# Patient Record
Sex: MALE | Race: WHITE | NOT HISPANIC OR LATINO | Employment: FULL TIME | URBAN - METROPOLITAN AREA
[De-identification: names, ages, dates, MRNs, and addresses within clinical notes are randomized per-mention and may not be internally consistent; named-entity substitution may affect disease eponyms.]

---

## 2019-07-12 LAB
ALBUMIN: 4 G/DL (ref 3.4–5)
ALP BLD-CCNC: 86 U/L (ref 33–120)
ALT SERPL-CCNC: 39 U/L (ref 10–52)
ANION GAP SERPL CALCULATED.3IONS-SCNC: 11 MMOL/L (ref 10–20)
AST SERPL-CCNC: 27 U/L (ref 9–39)
BICARBONATE: 27 MMOL/L (ref 21–32)
BILIRUB SERPL-MCNC: 0.5 MG/DL (ref 0–1.2)
CALCIUM SERPL-MCNC: 8.9 MG/DL (ref 8.6–10.3)
CHLORIDE BLD-SCNC: 104 MMOL/L (ref 98–107)
CHOLESTEROL/HDL RATIO: 4.5
CHOLESTEROL: 147 MG/DL (ref 0–199)
CREAT SERPL-MCNC: 0.87 MG/DL (ref 0.5–1.3)
ERYTHROCYTE [DISTWIDTH] IN BLOOD BY AUTOMATED COUNT: 11.9 % (ref 11.5–14)
GFR AFRICAN AMERICAN: >60 ML/MIN/1.73M2
GFR NON-AFRICAN AMERICAN: >60 ML/MIN/1.73M2
GLUCOSE: 104 MG/DL (ref 74–99)
HCT VFR BLD CALC: 45.3 % (ref 41–52)
HDLC SERPL-MCNC: 33 MG/DL
HEMOGLOBIN: 15.1 G/DL (ref 13.5–17)
LDL CHOLESTEROL: 94 MG/DL (ref 0–99)
MCHC RBC AUTO-ENTMCNC: 33.3 G/DL (ref 32–36)
MCV RBC AUTO: 99 FL (ref 80–100)
PLATELET # BLD: 245 X10E9/L (ref 150–450)
POTASSIUM SERPL-SCNC: 4.2 MMOL/L (ref 3.5–5.3)
RBC # BLD: 4.58 X10E12/L (ref 4.5–5.9)
SODIUM BLD-SCNC: 138 MMOL/L (ref 136–145)
TOTAL PROTEIN: 6.4 G/DL (ref 6.4–8.2)
TRIGL SERPL-MCNC: 100 MG/DL (ref 0–149)
UREA NITROGEN: 20 MG/DL (ref 6–23)
VLDLC SERPL CALC-MCNC: 20 MG/DL (ref 0–40)
WBC: 6.6 X10E9/L (ref 4.4–11.3)

## 2021-07-14 LAB
ALBUMIN SERPL-MCNC: 4 G/DL (ref 3.5–4.6)
ALP BLD-CCNC: 93 U/L (ref 35–104)
ALT SERPL-CCNC: 34 U/L (ref 0–41)
ANION GAP SERPL CALCULATED.3IONS-SCNC: 9 MEQ/L (ref 9–15)
AST SERPL-CCNC: 26 U/L (ref 0–40)
BILIRUB SERPL-MCNC: 0.5 MG/DL (ref 0.2–0.7)
BUN BLDV-MCNC: 16 MG/DL (ref 6–20)
CALCIUM SERPL-MCNC: 8.9 MG/DL (ref 8.5–9.9)
CHLORIDE BLD-SCNC: 103 MEQ/L (ref 95–107)
CO2: 24 MEQ/L (ref 20–31)
CREAT SERPL-MCNC: 0.81 MG/DL (ref 0.7–1.2)
GFR AFRICAN AMERICAN: >60
GFR NON-AFRICAN AMERICAN: >60
GLOBULIN: 2.5 G/DL (ref 2.3–3.5)
GLUCOSE BLD-MCNC: 101 MG/DL (ref 70–99)
HCT VFR BLD CALC: 44.9 % (ref 42–52)
HEMOGLOBIN: 15.1 G/DL (ref 14–18)
MCH RBC QN AUTO: 33.6 PG (ref 27–31.3)
MCHC RBC AUTO-ENTMCNC: 33.7 % (ref 33–37)
MCV RBC AUTO: 99.8 FL (ref 80–100)
PDW BLD-RTO: 13.1 % (ref 11.5–14.5)
PLATELET # BLD: 228 K/UL (ref 130–400)
POTASSIUM SERPL-SCNC: 4.1 MEQ/L (ref 3.4–4.9)
RBC # BLD: 4.5 M/UL (ref 4.7–6.1)
SODIUM BLD-SCNC: 136 MEQ/L (ref 135–144)
TOTAL PROTEIN: 6.5 G/DL (ref 6.3–8)
WBC # BLD: 5.9 K/UL (ref 4.8–10.8)

## 2023-03-09 LAB
ALANINE AMINOTRANSFERASE (SGPT) (U/L) IN SER/PLAS: 31 U/L (ref 10–52)
ALBUMIN (G/DL) IN SER/PLAS: 4.1 G/DL (ref 3.4–5)
ALBUMIN: 4.1 G/DL (ref 3.4–5)
ALKALINE PHOSPHATASE (U/L) IN SER/PLAS: 77 U/L (ref 33–120)
ALP BLD-CCNC: 77 U/L (ref 33–120)
ALT SERPL-CCNC: 31 U/L (ref 10–52)
ANION GAP IN SER/PLAS: 10 MMOL/L (ref 10–20)
ANION GAP SERPL CALCULATED.3IONS-SCNC: 10 MMOL/L (ref 10–20)
ASPARTATE AMINOTRANSFERASE (SGOT) (U/L) IN SER/PLAS: 23 U/L (ref 9–39)
AST SERPL-CCNC: 23 U/L (ref 9–39)
BICARBONATE: 28 MMOL/L (ref 21–32)
BILIRUB SERPL-MCNC: 0.7 MG/DL (ref 0–1.2)
BILIRUBIN DIRECT (MG/DL) IN SER/PLAS: 0.1 MG/DL (ref 0–0.3)
BILIRUBIN DIRECT: 0.1 MG/DL (ref 0–0.3)
BILIRUBIN TOTAL (MG/DL) IN SER/PLAS: 0.7 MG/DL (ref 0–1.2)
CALCIUM (MG/DL) IN SER/PLAS: 9.2 MG/DL (ref 8.6–10.3)
CALCIUM SERPL-MCNC: 9.2 MG/DL (ref 8.6–10.3)
CARBON DIOXIDE, TOTAL (MMOL/L) IN SER/PLAS: 28 MMOL/L (ref 21–32)
CHLORIDE (MMOL/L) IN SER/PLAS: 104 MMOL/L (ref 98–107)
CHLORIDE BLD-SCNC: 104 MMOL/L (ref 98–107)
CHOLESTEROL (MG/DL) IN SER/PLAS: 101 MG/DL (ref 0–199)
CHOLESTEROL IN HDL (MG/DL) IN SER/PLAS: 36.1 MG/DL
CHOLESTEROL/HDL RATIO: 2.8
CHOLESTEROL/HDL RATIO: 2.8
CHOLESTEROL: 101 MG/DL (ref 0–199)
CREAT SERPL-MCNC: 0.93 MG/DL (ref 0.5–1.3)
CREATININE (MG/DL) IN SER/PLAS: 0.93 MG/DL (ref 0.5–1.3)
EGFR MALE: >90 ML/MIN/1.73M2
ERYTHROCYTE DISTRIBUTION WIDTH (RATIO) BY AUTOMATED COUNT: 11.9 % (ref 11.5–14.5)
ERYTHROCYTE MEAN CORPUSCULAR HEMOGLOBIN CONCENTRATION (G/DL) BY AUTOMATED: 33.7 G/DL (ref 32–36)
ERYTHROCYTE MEAN CORPUSCULAR VOLUME (FL) BY AUTOMATED COUNT: 99 FL (ref 80–100)
ERYTHROCYTE [DISTWIDTH] IN BLOOD BY AUTOMATED COUNT: 11.9 % (ref 11.5–14)
ERYTHROCYTES (10*6/UL) IN BLOOD BY AUTOMATED COUNT: 4.63 X10E12/L (ref 4.5–5.9)
GFR MALE: >90 ML/MIN/1.73M2
GLUCOSE (MG/DL) IN SER/PLAS: 99 MG/DL (ref 74–99)
GLUCOSE: 99 MG/DL (ref 74–99)
HCT VFR BLD CALC: 45.7 % (ref 41–52)
HDLC SERPL-MCNC: 36.1 MG/DL
HEMATOCRIT (%) IN BLOOD BY AUTOMATED COUNT: 45.7 % (ref 41–52)
HEMOGLOBIN (G/DL) IN BLOOD: 15.4 G/DL (ref 13.5–17.5)
HEMOGLOBIN: 15.4 G/DL (ref 13.5–17)
LDL CHOLESTEROL: 41 MG/DL (ref 0–99)
LDL: 41 MG/DL (ref 0–99)
LEUKOCYTES (10*3/UL) IN BLOOD BY AUTOMATED COUNT: 6.1 X10E9/L (ref 4.4–11.3)
MAGNESIUM (MG/DL) IN SER/PLAS: 1.74 MG/DL (ref 1.6–2.4)
MAGNESIUM: 1.74 MG/DL (ref 1.6–2.4)
MCHC RBC AUTO-ENTMCNC: 33.7 G/DL (ref 32–36)
MCV RBC AUTO: 99 FL (ref 80–100)
PLATELET # BLD: 246 X10E9/L (ref 150–450)
PLATELETS (10*3/UL) IN BLOOD AUTOMATED COUNT: 246 X10E9/L (ref 150–450)
POTASSIUM (MMOL/L) IN SER/PLAS: 4.3 MMOL/L (ref 3.5–5.3)
POTASSIUM SERPL-SCNC: 4.3 MMOL/L (ref 3.5–5.3)
PROTEIN TOTAL: 6.5 G/DL (ref 6.4–8.2)
RBC # BLD: 4.63 X10E12/L (ref 4.5–5.9)
SODIUM (MMOL/L) IN SER/PLAS: 138 MMOL/L (ref 136–145)
SODIUM BLD-SCNC: 138 MMOL/L (ref 136–145)
THYROTROPIN (MIU/L) IN SER/PLAS BY DETECTION LIMIT <= 0.05 MIU/L: 1.3 MIU/L (ref 0.44–3.98)
TOTAL PROTEIN: 6.5 G/DL (ref 6.4–8.2)
TRIGL SERPL-MCNC: 121 MG/DL (ref 0–149)
TRIGLYCERIDE (MG/DL) IN SER/PLAS: 121 MG/DL (ref 0–149)
TSH SERPL DL<=0.05 MIU/L-ACNC: 1.3 MIU/L (ref 0.44–3.9)
UREA NITROGEN (MG/DL) IN SER/PLAS: 18 MG/DL (ref 6–23)
UREA NITROGEN: 18 MG/DL (ref 6–23)
VLDL: 24 MG/DL (ref 0–40)
VLDLC SERPL CALC-MCNC: 24 MG/DL (ref 0–40)
WBC: 6.1 X10E9/L (ref 4.4–11.3)

## 2023-03-22 LAB
ANION GAP IN SER/PLAS: 10 MMOL/L (ref 10–20)
ANION GAP SERPL CALCULATED.3IONS-SCNC: 10 MMOL/L (ref 10–20)
BICARBONATE: 29 MMOL/L (ref 21–32)
CALCIUM (MG/DL) IN SER/PLAS: 8.7 MG/DL (ref 8.6–10.3)
CALCIUM SERPL-MCNC: 8.7 MG/DL (ref 8.6–10.3)
CARBON DIOXIDE, TOTAL (MMOL/L) IN SER/PLAS: 29 MMOL/L (ref 21–32)
CHLORIDE (MMOL/L) IN SER/PLAS: 104 MMOL/L (ref 98–107)
CHLORIDE BLD-SCNC: 104 MMOL/L (ref 98–107)
CREAT SERPL-MCNC: 0.89 MG/DL (ref 0.5–1.3)
CREATININE (MG/DL) IN SER/PLAS: 0.89 MG/DL (ref 0.5–1.3)
EGFR MALE: >90 ML/MIN/1.73M2
GFR MALE: >90 ML/MIN/1.73M2
GLUCOSE (MG/DL) IN SER/PLAS: 106 MG/DL (ref 74–99)
GLUCOSE: 106 MG/DL (ref 74–99)
POTASSIUM (MMOL/L) IN SER/PLAS: 4.1 MMOL/L (ref 3.5–5.3)
POTASSIUM SERPL-SCNC: 4.1 MMOL/L (ref 3.5–5.3)
SODIUM (MMOL/L) IN SER/PLAS: 139 MMOL/L (ref 136–145)
SODIUM BLD-SCNC: 139 MMOL/L (ref 136–145)
UREA NITROGEN (MG/DL) IN SER/PLAS: 20 MG/DL (ref 6–23)
UREA NITROGEN: 20 MG/DL (ref 6–23)

## 2023-07-05 LAB
ANION GAP IN SER/PLAS: 12 MMOL/L (ref 10–20)
ANION GAP SERPL CALCULATED.3IONS-SCNC: 12 MMOL/L (ref 10–20)
BICARBONATE: 28 MMOL/L (ref 21–32)
CALCIUM (MG/DL) IN SER/PLAS: 8.8 MG/DL (ref 8.6–10.3)
CALCIUM SERPL-MCNC: 8.8 MG/DL (ref 8.6–10.3)
CARBON DIOXIDE, TOTAL (MMOL/L) IN SER/PLAS: 28 MMOL/L (ref 21–32)
CHLORIDE (MMOL/L) IN SER/PLAS: 104 MMOL/L (ref 98–107)
CHLORIDE BLD-SCNC: 104 MMOL/L (ref 98–107)
CREAT SERPL-MCNC: 0.93 MG/DL (ref 0.5–1.3)
CREATININE (MG/DL) IN SER/PLAS: 0.93 MG/DL (ref 0.5–1.3)
EGFR MALE: >90 ML/MIN/1.73M2
GFR MALE: >90 ML/MIN/1.73M2
GLUCOSE (MG/DL) IN SER/PLAS: 102 MG/DL (ref 74–99)
GLUCOSE: 102 MG/DL (ref 74–99)
POTASSIUM (MMOL/L) IN SER/PLAS: 4.1 MMOL/L (ref 3.5–5.3)
POTASSIUM SERPL-SCNC: 4.1 MMOL/L (ref 3.5–5.3)
SODIUM (MMOL/L) IN SER/PLAS: 140 MMOL/L (ref 136–145)
SODIUM BLD-SCNC: 140 MMOL/L (ref 136–145)
UREA NITROGEN (MG/DL) IN SER/PLAS: 20 MG/DL (ref 6–23)
UREA NITROGEN: 20 MG/DL (ref 6–23)

## 2023-09-13 LAB
ANION GAP IN SER/PLAS: 12 MMOL/L (ref 10–20)
ANION GAP SERPL CALCULATED.3IONS-SCNC: 12 MMOL/L (ref 10–20)
BICARBONATE: 26 MMOL/L (ref 21–32)
CALCIUM (MG/DL) IN SER/PLAS: 9 MG/DL (ref 8.6–10.3)
CALCIUM SERPL-MCNC: 9 MG/DL (ref 8.6–10.3)
CARBON DIOXIDE, TOTAL (MMOL/L) IN SER/PLAS: 26 MMOL/L (ref 21–32)
CHLORIDE (MMOL/L) IN SER/PLAS: 106 MMOL/L (ref 98–107)
CHLORIDE BLD-SCNC: 106 MMOL/L (ref 98–107)
CHOLESTEROL (MG/DL) IN SER/PLAS: 84 MG/DL (ref 0–199)
CHOLESTEROL IN HDL (MG/DL) IN SER/PLAS: 39 MG/DL
CHOLESTEROL/HDL RATIO: 2.2
CHOLESTEROL/HDL RATIO: 2.2
CHOLESTEROL: 84 MG/DL (ref 0–199)
CREAT SERPL-MCNC: 0.93 MG/DL (ref 0.5–1.3)
CREATININE (MG/DL) IN SER/PLAS: 0.93 MG/DL (ref 0.5–1.3)
EGFR MALE: >90 ML/MIN/1.73M2
ERYTHROCYTE DISTRIBUTION WIDTH (RATIO) BY AUTOMATED COUNT: 12 % (ref 11.5–14.5)
ERYTHROCYTE MEAN CORPUSCULAR HEMOGLOBIN CONCENTRATION (G/DL) BY AUTOMATED: 33.6 G/DL (ref 32–36)
ERYTHROCYTE MEAN CORPUSCULAR VOLUME (FL) BY AUTOMATED COUNT: 99 FL (ref 80–100)
ERYTHROCYTE [DISTWIDTH] IN BLOOD BY AUTOMATED COUNT: 12 % (ref 11.5–14.5)
ERYTHROCYTES (10*6/UL) IN BLOOD BY AUTOMATED COUNT: 4.44 X10E12/L (ref 4.5–5.9)
GFR MALE: >90 ML/MIN/1.73M2
GLUCOSE (MG/DL) IN SER/PLAS: 116 MG/DL (ref 74–99)
GLUCOSE: 116 MG/DL (ref 74–99)
HCT VFR BLD CALC: 44 % (ref 41–52)
HDLC SERPL-MCNC: 39 MG/DL
HEMATOCRIT (%) IN BLOOD BY AUTOMATED COUNT: 44 % (ref 41–52)
HEMOGLOBIN (G/DL) IN BLOOD: 14.8 G/DL (ref 13.5–17.5)
HEMOGLOBIN: 14.8 G/DL (ref 13.5–17.5)
LDL CHOLESTEROL: 25 MG/DL (ref 0–99)
LDL: 25 MG/DL (ref 0–99)
LEUKOCYTES (10*3/UL) IN BLOOD BY AUTOMATED COUNT: 5.9 X10E9/L (ref 4.4–11.3)
MCHC RBC AUTO-ENTMCNC: 33.6 G/DL (ref 32–36)
MCV RBC AUTO: 99 FL (ref 80–100)
PLATELET # BLD: 279 X10E9/L (ref 150–450)
PLATELETS (10*3/UL) IN BLOOD AUTOMATED COUNT: 279 X10E9/L (ref 150–450)
POTASSIUM (MMOL/L) IN SER/PLAS: 4.2 MMOL/L (ref 3.5–5.3)
POTASSIUM SERPL-SCNC: 4.2 MMOL/L (ref 3.5–5.3)
RBC # BLD: 4.44 X10E12/L (ref 4.5–5.9)
SODIUM (MMOL/L) IN SER/PLAS: 140 MMOL/L (ref 136–145)
SODIUM BLD-SCNC: 140 MMOL/L (ref 136–145)
TRIGL SERPL-MCNC: 98 MG/DL (ref 0–149)
TRIGLYCERIDE (MG/DL) IN SER/PLAS: 98 MG/DL (ref 0–149)
UREA NITROGEN (MG/DL) IN SER/PLAS: 18 MG/DL (ref 6–23)
UREA NITROGEN: 18 MG/DL (ref 6–23)
VLDL: 20 MG/DL (ref 0–40)
VLDLC SERPL CALC-MCNC: 20 MG/DL (ref 0–40)
WBC: 5.9 X10E9/L (ref 4.4–11.3)

## 2023-12-26 PROBLEM — E78.5 HLD (HYPERLIPIDEMIA): Status: ACTIVE | Noted: 2023-12-26

## 2023-12-26 PROBLEM — I25.110 CORONARY ARTERY DISEASE INVOLVING NATIVE HEART WITH UNSTABLE ANGINA PECTORIS (MULTI): Status: ACTIVE | Noted: 2023-12-26

## 2023-12-26 PROBLEM — Z95.1 S/P CABG (CORONARY ARTERY BYPASS GRAFT): Status: ACTIVE | Noted: 2023-12-26

## 2023-12-26 PROBLEM — R51.9 FREQUENT HEADACHES: Status: ACTIVE | Noted: 2023-12-26

## 2023-12-26 PROBLEM — I73.9 PVD (PERIPHERAL VASCULAR DISEASE) (CMS-HCC): Status: ACTIVE | Noted: 2023-12-26

## 2023-12-26 PROBLEM — I25.10 ATHEROSCLEROSIS OF CORONARY ARTERY: Status: ACTIVE | Noted: 2023-12-26

## 2023-12-26 PROBLEM — I10 PRIMARY HYPERTENSION: Status: ACTIVE | Noted: 2023-12-26

## 2023-12-26 PROBLEM — E78.1 HYPERTRIGLYCERIDEMIA: Status: ACTIVE | Noted: 2023-12-26

## 2023-12-26 PROBLEM — R09.89 BRUIT OF LEFT CAROTID ARTERY: Status: ACTIVE | Noted: 2023-12-26

## 2023-12-26 RX ORDER — LISINOPRIL 20 MG/1
1 TABLET ORAL DAILY
COMMUNITY
End: 2024-03-28 | Stop reason: SDUPTHER

## 2023-12-26 RX ORDER — NITROGLYCERIN 0.4 MG/1
TABLET SUBLINGUAL
COMMUNITY

## 2023-12-26 RX ORDER — BUPROPION HYDROCHLORIDE 300 MG/1
1 TABLET ORAL DAILY
COMMUNITY

## 2023-12-26 RX ORDER — EVOLOCUMAB 140 MG/ML
140 INJECTION, SOLUTION SUBCUTANEOUS
COMMUNITY
Start: 2022-01-05 | End: 2024-01-11

## 2023-12-26 RX ORDER — ATORVASTATIN CALCIUM 10 MG/1
1 TABLET, FILM COATED ORAL NIGHTLY
COMMUNITY
Start: 2022-09-12 | End: 2024-03-28 | Stop reason: SDUPTHER

## 2023-12-26 RX ORDER — ASPIRIN 81 MG/1
1 TABLET ORAL DAILY
COMMUNITY

## 2024-03-28 DIAGNOSIS — E78.2 MIXED HYPERLIPIDEMIA: ICD-10-CM

## 2024-03-28 DIAGNOSIS — I10 PRIMARY HYPERTENSION: ICD-10-CM

## 2024-03-28 DIAGNOSIS — I25.110 CORONARY ARTERY DISEASE INVOLVING NATIVE CORONARY ARTERY OF NATIVE HEART WITH UNSTABLE ANGINA PECTORIS (MULTI): ICD-10-CM

## 2024-03-28 RX ORDER — LISINOPRIL 20 MG/1
20 TABLET ORAL DAILY
Qty: 90 TABLET | Refills: 0 | Status: SHIPPED | OUTPATIENT
Start: 2024-03-28

## 2024-03-28 RX ORDER — ATORVASTATIN CALCIUM 10 MG/1
10 TABLET, FILM COATED ORAL NIGHTLY
Qty: 90 TABLET | Refills: 0 | Status: SHIPPED | OUTPATIENT
Start: 2024-03-28

## 2024-03-28 NOTE — TELEPHONE ENCOUNTER
Pt called for rx refill of Atorvastatin and Lisinopril to [facility].    Escribed as requested.  Pending appt 6/19/24 with Dr. Leonard

## 2024-04-05 ENCOUNTER — TELEPHONE (OUTPATIENT)
Dept: CARDIOLOGY | Facility: CLINIC | Age: 59
End: 2024-04-05

## 2024-04-05 NOTE — TELEPHONE ENCOUNTER
Called patient's rx insurance plan in follow up to PA submitted 3/21/24.  Spoke with Shanthi who states PA approved 3/29/24.  She will fax letter to office.  Patient notified of same by phone and he verbalized understanding.  Alicia Orourke, CMA

## 2024-06-19 ENCOUNTER — APPOINTMENT (OUTPATIENT)
Dept: CARDIOLOGY | Facility: CLINIC | Age: 59
End: 2024-06-19
Payer: COMMERCIAL

## 2024-06-19 VITALS
SYSTOLIC BLOOD PRESSURE: 104 MMHG | HEART RATE: 74 BPM | HEIGHT: 68 IN | WEIGHT: 183.4 LBS | BODY MASS INDEX: 27.8 KG/M2 | DIASTOLIC BLOOD PRESSURE: 62 MMHG

## 2024-06-19 DIAGNOSIS — I25.10 ATHEROSCLEROSIS OF NATIVE CORONARY ARTERY OF NATIVE HEART WITHOUT ANGINA PECTORIS: ICD-10-CM

## 2024-06-19 DIAGNOSIS — E78.2 MIXED HYPERLIPIDEMIA: ICD-10-CM

## 2024-06-19 DIAGNOSIS — I10 PRIMARY HYPERTENSION: ICD-10-CM

## 2024-06-19 DIAGNOSIS — I77.9 BILATERAL CAROTID ARTERY DISEASE, UNSPECIFIED TYPE (CMS-HCC): ICD-10-CM

## 2024-06-19 PROBLEM — R09.89 BRUIT OF LEFT CAROTID ARTERY: Status: RESOLVED | Noted: 2023-12-26 | Resolved: 2024-06-19

## 2024-06-19 PROBLEM — E78.1 HYPERTRIGLYCERIDEMIA: Status: RESOLVED | Noted: 2023-12-26 | Resolved: 2024-06-19

## 2024-06-19 PROBLEM — I25.110 CORONARY ARTERY DISEASE INVOLVING NATIVE HEART WITH UNSTABLE ANGINA PECTORIS (MULTI): Status: RESOLVED | Noted: 2023-12-26 | Resolved: 2024-06-19

## 2024-06-19 PROCEDURE — 3074F SYST BP LT 130 MM HG: CPT | Performed by: INTERNAL MEDICINE

## 2024-06-19 PROCEDURE — 99213 OFFICE O/P EST LOW 20 MIN: CPT | Performed by: INTERNAL MEDICINE

## 2024-06-19 PROCEDURE — 3078F DIAST BP <80 MM HG: CPT | Performed by: INTERNAL MEDICINE

## 2024-06-19 RX ORDER — LISINOPRIL 20 MG/1
20 TABLET ORAL DAILY
Qty: 90 TABLET | Refills: 3 | Status: SHIPPED | OUTPATIENT
Start: 2024-06-19

## 2024-06-19 RX ORDER — ATORVASTATIN CALCIUM 10 MG/1
10 TABLET, FILM COATED ORAL NIGHTLY
Qty: 90 TABLET | Refills: 3 | Status: SHIPPED | OUTPATIENT
Start: 2024-06-19

## 2024-06-19 RX ORDER — NITROGLYCERIN 0.4 MG/1
0.4 TABLET SUBLINGUAL EVERY 5 MIN PRN
Qty: 25 TABLET | Refills: 5 | Status: SHIPPED | OUTPATIENT
Start: 2024-06-19

## 2024-06-19 NOTE — PATIENT INSTRUCTIONS
Dr. Leonard would like you to have a carotid ultrasound near future    Fasting labs to be done within next 1-2 weeks      -Please bring all medicines, vitamins, and herbal supplements with you in original bottles to every appointment!!!!    -Prescriptions will not be filled unless you are compliant with your follow up appointments or have a follow up appointment scheduled as per instruction of your physician. Refills should be requested at the time of your visit.

## 2024-06-19 NOTE — PROGRESS NOTES
Patient:  Juan Manuel Yeager  YOB: 1965  MRN: [id number]       Impression/Plan:     Diagnoses and all orders for this visit:  Atherosclerosis of native coronary artery of native heart without angina pectoris  -     Able to accomplish a very high functional capacity without angina or CHF symptoms  -    Very compliant with antiplatelet and statin therapy continue same  -     He is less than 5 years status post bypass surgery that included a left internal mammary artery bypass no indication for any sort of invasive or noninvasive assessment of coronary disease as at very low risk of progression at this point  Bilateral carotid artery disease, unspecified type (CMS-HCC)  -     No neurologic symptoms very soft bruit only been over a year since last assessment for surveillance testing obtain duplex  -     Vascular US Carotid Artery Duplex Bilateral; Future  Mixed hyperlipidemia  -     Continue statin laboratories pending  -     atorvastatin (Lipitor) 10 mg tablet; Take 1 tablet (10 mg) by mouth once daily at bedtime.  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 male who presents with coronary artery disease having undergone CABG August 2019 4 vessels including LIMA. Also with hypertension, hyperlipidemia and peripheral vascular disease.  He is statin intolerant hence on Repatha and low-dose Lipitor .  7/17/2023 CT angiography severe proximal left iliac stenosis and severe right common femoral artery stenosis with occlusion the right SFA for a length of 11 cm. No aneurysm was identified.  mild carotid artery disease duplex 2000 1950 to 69% on the left    I had last seen him 9/13/2023 at which time he had a high functional capacity without cardiovascular symptoms and he was tolerating PCSK9 inhibitor and  low-dose statin well.  At that time his symptoms were starting to improve with an exercise program.  He is followed by Dr. Linder for his severe vascular disease. lab work at that time normal with excellent control of cholesterol normal renal and liver function.    He denies angina, dyspnea, palpitation, edema, lightheadedness or syncope.  He has had no symptoms of claudication or neurologic deterioration.  There have been no hospitalizations or emergency room visits since last office visit.    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  This is a very physical job he has to carry large diameter hose 50 yards then pull it back again.  With this level of activity no chest tightness pressure heaviness or shortness of breath.  Had no palpitation, lightheadedness or syncope.  He had no difficulty sleeping.  He still has claudication but he is able to accomplish what he needs to with minimal discomfort of his legs overall it is stable and is not impeding what he wishes to do at this point in time.  He is had no discoloration of his feet.  He's has had no neurologic events.    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  He has no cardiac contraindications to drive commercially.                Review of Systems: Unremarkable except as noted above    Meds     Current Outpatient Medications   Medication Instructions    aspirin 81 mg EC tablet 1 tablet, oral, Daily    atorvastatin (LIPITOR) 10 mg, oral, Nightly    buPROPion XL (Wellbutrin XL) 300 mg 24 hr tablet 1 tablet, oral, Daily    evolocumab (Repatha SureClick) 140 mg/mL injection USE 1 INJECTION EVERY 2 WEEKS    lisinopril 20 mg, oral, Daily    nitroglycerin (Nitrostat) 0.4 mg SL tablet PLACE 1 TABLET UNDER THE TONGUE EVERY 5 MINUTES FOR UP TO 3 DOSES AS NEEDED FOR CHEST PAIN.CALL 911 IF PAIN PERSISTS.        Allergies     Allergies   Allergen Reactions    Penicillins Other    Pravastatin  "Myalgia    Rosuvastatin Myalgia    Simvastatin Other         Annotated Problems     Specialty Problems          Cardiology Problems    Atherosclerosis of coronary artery      · 9/21/2022 stress test: 10.1 MET without evidence of ischemia at 94% target heart rate      2019, 4 vessel, L-LAD, Free JOHN T graft off LIMA to OM2, SVG- D1, SVG - PLV              7/15/2019 angiography. LVEF 55%. LM-normal. LAD-90%. CX-80%. RCA-occluded         HLD (hyperlipidemia)    Primary hypertension    PVD (peripheral vascular disease) (CMS-HCC)     7/17/2023 CT angiography severe proximal left iliac stenosis and severe right common femoral artery stenosis with occlusion the right SFA for a length of 11 cm. No aneurysm was identified.         S/P CABG (coronary artery bypass graft)         2019, 4 vessel, L-LAD, Free JOHN T graft off LIMA to OM2, SVG- D1, SVG - PLV         Bilateral carotid artery disease (CMS-HCC)       2019 less than 50% right internal carotid stenosis 50 to 69% on the left via duplex             Problem List     Patient Active Problem List    Diagnosis Date Noted    Bilateral carotid artery disease (CMS-HCC) 06/19/2024    Atherosclerosis of coronary artery 12/26/2023    Frequent headaches 12/26/2023    HLD (hyperlipidemia) 12/26/2023    Primary hypertension 12/26/2023    PVD (peripheral vascular disease) (CMS-HCC) 12/26/2023    S/P CABG (coronary artery bypass graft) 12/26/2023       Objective:     Vitals:    06/19/24 0751   BP: 104/62   BP Location: Left arm   Patient Position: Sitting   Pulse: 74   Weight: 83.2 kg (183 lb 6.4 oz)   Height: 1.727 m (5' 8\")      Wt Readings from Last 4 Encounters:   06/19/24 83.2 kg (183 lb 6.4 oz)   07/27/23 86.6 kg (191 lb)   03/29/23 87.5 kg (193 lb)   03/22/23 87.5 kg (193 lb)           LAB:     Lab Results   Component Value Date    WBC 5.9 09/13/2023    HGB 14.8 09/13/2023    HCT 44.0 09/13/2023     09/13/2023    CHOL 84 09/13/2023    TRIG 98 09/13/2023    HDL 39.0 (A) " 09/13/2023    ALT 31 03/09/2023    AST 23 03/09/2023     09/13/2023    K 4.2 09/13/2023     09/13/2023    CREATININE 0.93 09/13/2023    BUN 18 09/13/2023    CO2 26 09/13/2023    TSH 1.30 03/09/2023    INR 1.1 07/15/2019    HGBA1C 5.4 07/15/2019       Diagnostic Studies:     Patient was never admitted.      Radiology:     No orders to display       Physical Exam     General Appearance: alert and oriented to person, place and time, in no acute distress  Cardiovascular: normal rate, regular rhythm, normal S1 and S2, no murmurs, rubs, clicks, or gallops,  no JVD  Pulmonary/Chest: clear to auscultation bilaterally- no wheezes, rales or rhonchi, normal air movement, no respiratory distress  Abdomen: soft, non-tender, non-distended, normal bowel sounds, no masses   Extremities: no cyanosis, clubbing or edema  Skin: warm and dry, no rash or erythema  Eyes: EOMI  Neck: supple and non-tender without mass, no thyromegaly   Neurological: alert, oriented, normal speech, no focal findings or movement disorder noted  Vascular:  2_+ pulses, soft carotid bruit              Scribe Attestation  By signing my name below, IBriseida LPN , Scribe   attest that this documentation has been prepared under the direction and in the presence of Dylan Leonard MD.

## 2024-06-26 ENCOUNTER — LAB (OUTPATIENT)
Dept: LAB | Facility: LAB | Age: 59
End: 2024-06-26
Payer: COMMERCIAL

## 2024-06-26 DIAGNOSIS — E78.2 MIXED HYPERLIPIDEMIA: ICD-10-CM

## 2024-06-26 DIAGNOSIS — I77.9 BILATERAL CAROTID ARTERY DISEASE, UNSPECIFIED TYPE (CMS-HCC): ICD-10-CM

## 2024-06-26 DIAGNOSIS — I25.10 ATHEROSCLEROSIS OF NATIVE CORONARY ARTERY OF NATIVE HEART WITHOUT ANGINA PECTORIS: ICD-10-CM

## 2024-06-26 LAB
ALBUMIN SERPL BCP-MCNC: 3.9 G/DL (ref 3.4–5)
ALP SERPL-CCNC: 95 U/L (ref 33–136)
ALT SERPL W P-5'-P-CCNC: 30 U/L (ref 10–52)
ANION GAP SERPL CALC-SCNC: 9 MMOL/L (ref 10–20)
AST SERPL W P-5'-P-CCNC: 25 U/L (ref 9–39)
BILIRUB SERPL-MCNC: 0.5 MG/DL (ref 0–1.2)
BUN SERPL-MCNC: 23 MG/DL (ref 6–23)
CALCIUM SERPL-MCNC: 8.7 MG/DL (ref 8.6–10.3)
CHLORIDE SERPL-SCNC: 106 MMOL/L (ref 98–107)
CHOLEST SERPL-MCNC: 74 MG/DL (ref 0–199)
CHOLESTEROL/HDL RATIO: 2
CO2 SERPL-SCNC: 28 MMOL/L (ref 21–32)
CREAT SERPL-MCNC: 0.89 MG/DL (ref 0.5–1.3)
EGFRCR SERPLBLD CKD-EPI 2021: >90 ML/MIN/1.73M*2
ERYTHROCYTE [DISTWIDTH] IN BLOOD BY AUTOMATED COUNT: 12.1 % (ref 11.5–14.5)
GLUCOSE SERPL-MCNC: 96 MG/DL (ref 74–99)
HCT VFR BLD AUTO: 44.2 % (ref 41–52)
HDLC SERPL-MCNC: 37.2 MG/DL
HGB BLD-MCNC: 14.9 G/DL (ref 13.5–17.5)
LDLC SERPL CALC-MCNC: 20 MG/DL
MCH RBC QN AUTO: 33.9 PG (ref 26–34)
MCHC RBC AUTO-ENTMCNC: 33.7 G/DL (ref 32–36)
MCV RBC AUTO: 101 FL (ref 80–100)
NON HDL CHOLESTEROL: 37 MG/DL (ref 0–149)
NRBC BLD-RTO: 0 /100 WBCS (ref 0–0)
PLATELET # BLD AUTO: 236 X10*3/UL (ref 150–450)
POTASSIUM SERPL-SCNC: 4.2 MMOL/L (ref 3.5–5.3)
PROT SERPL-MCNC: 6 G/DL (ref 6.4–8.2)
RBC # BLD AUTO: 4.4 X10*6/UL (ref 4.5–5.9)
SODIUM SERPL-SCNC: 139 MMOL/L (ref 136–145)
TRIGL SERPL-MCNC: 84 MG/DL (ref 0–149)
VLDL: 17 MG/DL (ref 0–40)
WBC # BLD AUTO: 7 X10*3/UL (ref 4.4–11.3)

## 2024-06-26 PROCEDURE — 80053 COMPREHEN METABOLIC PANEL: CPT

## 2024-06-26 PROCEDURE — 80061 LIPID PANEL: CPT

## 2024-06-26 PROCEDURE — 85027 COMPLETE CBC AUTOMATED: CPT

## 2024-06-26 PROCEDURE — 36415 COLL VENOUS BLD VENIPUNCTURE: CPT

## 2024-07-24 ENCOUNTER — ANCILLARY PROCEDURE (OUTPATIENT)
Dept: CARDIOLOGY | Facility: HOSPITAL | Age: 59
End: 2024-07-24
Payer: COMMERCIAL

## 2024-07-24 DIAGNOSIS — I77.9 BILATERAL CAROTID ARTERY DISEASE, UNSPECIFIED TYPE (CMS-HCC): ICD-10-CM

## 2024-07-24 DIAGNOSIS — I73.9 PERIPHERAL VASCULAR DISEASE, UNSPECIFIED (CMS-HCC): ICD-10-CM

## 2024-07-24 DIAGNOSIS — I65.23 OCCLUSION AND STENOSIS OF BILATERAL CAROTID ARTERIES: ICD-10-CM

## 2024-07-24 PROCEDURE — 93923 UPR/LXTR ART STDY 3+ LVLS: CPT | Performed by: INTERNAL MEDICINE

## 2024-07-24 PROCEDURE — 93880 EXTRACRANIAL BILAT STUDY: CPT

## 2024-07-24 PROCEDURE — 93880 EXTRACRANIAL BILAT STUDY: CPT | Performed by: INTERNAL MEDICINE

## 2024-07-24 PROCEDURE — 93923 UPR/LXTR ART STDY 3+ LVLS: CPT

## 2024-08-01 ENCOUNTER — OFFICE VISIT (OUTPATIENT)
Dept: VASCULAR SURGERY | Facility: CLINIC | Age: 59
End: 2024-08-01
Payer: COMMERCIAL

## 2024-08-01 VITALS — HEART RATE: 77 BPM | DIASTOLIC BLOOD PRESSURE: 70 MMHG | SYSTOLIC BLOOD PRESSURE: 115 MMHG | TEMPERATURE: 97.3 F

## 2024-08-01 DIAGNOSIS — I65.22 STENOSIS OF LEFT CAROTID ARTERY: ICD-10-CM

## 2024-08-01 DIAGNOSIS — I73.9 PERIPHERAL VASCULAR DISEASE, UNSPECIFIED (CMS-HCC): Primary | ICD-10-CM

## 2024-08-01 PROCEDURE — 3074F SYST BP LT 130 MM HG: CPT | Performed by: SURGERY

## 2024-08-01 PROCEDURE — 99215 OFFICE O/P EST HI 40 MIN: CPT | Performed by: SURGERY

## 2024-08-01 PROCEDURE — 3078F DIAST BP <80 MM HG: CPT | Performed by: SURGERY

## 2024-08-01 ASSESSMENT — PAIN SCALES - GENERAL: PAINLEVEL: 0-NO PAIN

## 2024-08-01 NOTE — PROGRESS NOTES
Vascular Surgery Clinic Note    CC: PAD    HPI:  Juan Manuel Yeager is 59 y.o. male with history of PAD with iliofemoral occlusive disease. We had discussed walking therapy for claudication when I saw him last year. He walks about 1 mile almost every day and says his legs have gotten much better. He works full time driving a fuel tanker and is able to complete his duties without issues. I reviewed his ANALIA which are 0.6 bilaterally, slightly better than last year. I reviewed his carotid duplex that shows <50% stenosis R ICA, 50-69% left ICA. He has not had stroke or TIA. He is maintained on ASA and repatha.     Medical History:   has no past medical history on file.    Meds:   Current Outpatient Medications on File Prior to Visit   Medication Sig Dispense Refill    aspirin 81 mg EC tablet Take 1 tablet (81 mg) by mouth once daily.      atorvastatin (Lipitor) 10 mg tablet Take 1 tablet (10 mg) by mouth once daily at bedtime. 90 tablet 3    buPROPion XL (Wellbutrin XL) 300 mg 24 hr tablet Take 1 tablet (300 mg) by mouth once daily.      evolocumab (Repatha SureClick) 140 mg/mL injection USE 1 INJECTION EVERY 2 WEEKS 6 each 3    lisinopril 20 mg tablet Take 1 tablet (20 mg) by mouth once daily. 90 tablet 3    nitroglycerin (Nitrostat) 0.4 mg SL tablet Place 1 tablet (0.4 mg) under the tongue every 5 minutes if needed for chest pain. place 1 tablet under tongue every 5 minutes for up to 3 doses as needed for chest pain. Call 911 if pain persists 25 tablet 5     No current facility-administered medications on file prior to visit.        Allergies:   Allergies   Allergen Reactions    Penicillins Other    Pravastatin Myalgia    Rosuvastatin Myalgia    Simvastatin Other       SH:    Social Determinants of Health     Tobacco Use: Medium Risk (6/19/2024)    Patient History     Smoking Tobacco Use: Former     Smokeless Tobacco Use: Never     Passive Exposure: Not on file   Alcohol Use: Not At Risk (2/7/2024)    Received from  [facility]    AUDIT-C     Frequency of Alcohol Consumption: 2-4 times a month     Average Number of Drinks: 1 or 2     Frequency of Binge Drinking: Never   Financial Resource Strain: Low Risk  (2/7/2024)    Received from [facility]    Overall Financial Resource Strain (CARDIA)     Difficulty of Paying Living Expenses: Not hard at all   Food Insecurity: No Food Insecurity (2/7/2024)    Received from [facility]    Hunger Vital Sign     Worried About Running Out of Food in the Last Year: Never true     Ran Out of Food in the Last Year: Never true   Transportation Needs: No Transportation Needs (2/7/2024)    Received from [facility]    PRAPARE - Transportation     Lack of Transportation (Medical): No     Lack of Transportation (Non-Medical): No   Physical Activity: Sufficiently Active (2/7/2024)    Received from [facility]    Exercise Vital Sign     Days of Exercise per Week: 6 days     Minutes of Exercise per Session: 40 min   Stress: No Stress Concern Present (2/7/2024)    Received from [facility] [place] [facility] - Occupational Stress Questionnaire     Feeling of Stress : Not at all   Social Connections: Moderately Isolated (2/7/2024)    Received from [facility]    Social Connection and Isolation Panel [NHANES]     Frequency of Communication with Friends and Family: More than three times a week     Frequency of Social Gatherings with Friends and Family: Once a week     Attends Restoration Services: Never     Active Member of Clubs or Organizations: No     Attends Club or Organization Meetings: Never     Marital Status:    Intimate Partner Violence: Not At Risk (2/7/2024)    Received from [facility]    Humiliation, Afraid, Rape, and Kick questionnaire     Fear of Current or Ex-Partner: No     Emotionally Abused:  No     Physically Abused: No     Sexually Abused: No   Depression: Not at risk (8/3/2019)    [facility]    PHQ-2     PHQ2 Score: 0   Housing Stability: High Risk (2/7/2024)    Received from [facility], [facility]    Housing Stability Vital Sign     Unable to Pay for Housing in the Last Year: Yes     Number of Places Lived in the Last Year: 1     Unstable Housing in the Last Year: No   Utilities: Not on file   Digital Equity: Not on file   Health Literacy: Not on file        FH:  No family history on file.     ROS:  All systems were reviewed and are negative except as per HPI.    Objective:  Vitals:  Vitals:    08/01/24 1039   BP: 115/70   Pulse: 77   Temp: 36.3 °C (97.3 °F)        Exam:  In NAD, well appearing  Abd Soft, ND/NT  Vascular examination:  Feet are warm, no wounds    Assessment & Plan:  Juan Manuel Yeager is 59 y.o. male doing well with minimally symptomatic PAD, and asympotmatic carotid stenosis. He quit smoking >20 years ago. RTC yearly for eugenia and carotid.      I spent a total of 40 minutes on the day of the visit.         Marquis Linder M.D.

## 2024-10-02 DIAGNOSIS — E78.00 PURE HYPERCHOLESTEROLEMIA: ICD-10-CM

## 2024-10-02 RX ORDER — EVOLOCUMAB 140 MG/ML
140 INJECTION, SOLUTION SUBCUTANEOUS
Qty: 6 EACH | Refills: 3 | Status: SHIPPED | OUTPATIENT
Start: 2024-10-02

## 2024-10-02 NOTE — TELEPHONE ENCOUNTER
Pt left [facility] requesting refill of Repatha. Escribed as requested to [facility].    Pending appt with Dr. Leonard 6/18/25

## 2025-01-09 ENCOUNTER — TELEPHONE (OUTPATIENT)
Dept: CARDIOLOGY | Facility: CLINIC | Age: 60
End: 2025-01-09
Payer: COMMERCIAL

## 2025-01-09 DIAGNOSIS — I25.10 ATHEROSCLEROSIS OF NATIVE CORONARY ARTERY OF NATIVE HEART WITHOUT ANGINA PECTORIS: ICD-10-CM

## 2025-01-09 NOTE — TELEPHONE ENCOUNTER
Pt returned call. He says that Repatha is covered and he will continue with this medication.     Pt transferred to [facility] to schedule pt for stress test.   Pt will keep pending appt with Dr. Leonard for 6/18/25.

## 2025-01-09 NOTE — TELEPHONE ENCOUNTER
The patient called into the office and stated that as the first of the year his insurance is no longer covering his Repatha. It will be over $500 a month and he cannot afford that at this time. 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 Please advise.

## 2025-01-09 NOTE — TELEPHONE ENCOUNTER
Advised patient of message and verbalized understanding.    Patient was given [employer] names to call insurance company.  Patient will call back with insurance response.     If not covered, patient will need lipid profile 2 months after last Repatha dose.

## 2025-05-21 ENCOUNTER — ANCILLARY PROCEDURE (OUTPATIENT)
Dept: CARDIOLOGY | Facility: HOSPITAL | Age: 60
End: 2025-05-21
Payer: COMMERCIAL

## 2025-05-21 DIAGNOSIS — I25.798 ATHEROSCLEROSIS OF OTHER CORONARY ARTERY BYPASS GRAFT(S) WITH OTHER FORMS OF ANGINA PECTORIS: ICD-10-CM

## 2025-05-21 DIAGNOSIS — I25.10 ATHEROSCLEROSIS OF NATIVE CORONARY ARTERY OF NATIVE HEART WITHOUT ANGINA PECTORIS: ICD-10-CM

## 2025-05-21 PROCEDURE — 93017 CV STRESS TEST TRACING ONLY: CPT

## 2025-05-21 PROCEDURE — 93016 CV STRESS TEST SUPVJ ONLY: CPT | Performed by: INTERNAL MEDICINE

## 2025-05-21 PROCEDURE — 93018 CV STRESS TEST I&R ONLY: CPT | Performed by: INTERNAL MEDICINE

## 2025-06-18 ENCOUNTER — APPOINTMENT (OUTPATIENT)
Dept: CARDIOLOGY | Facility: CLINIC | Age: 60
End: 2025-06-18
Payer: COMMERCIAL

## 2025-06-18 VITALS
WEIGHT: 181 LBS | HEART RATE: 83 BPM | SYSTOLIC BLOOD PRESSURE: 114 MMHG | HEIGHT: 68 IN | BODY MASS INDEX: 27.43 KG/M2 | DIASTOLIC BLOOD PRESSURE: 70 MMHG

## 2025-06-18 DIAGNOSIS — I73.9 CLAUDICATION: ICD-10-CM

## 2025-06-18 DIAGNOSIS — I10 PRIMARY HYPERTENSION: ICD-10-CM

## 2025-06-18 DIAGNOSIS — E78.2 MIXED HYPERLIPIDEMIA: ICD-10-CM

## 2025-06-18 DIAGNOSIS — I25.10 ATHEROSCLEROSIS OF NATIVE CORONARY ARTERY OF NATIVE HEART WITHOUT ANGINA PECTORIS: ICD-10-CM

## 2025-06-18 DIAGNOSIS — I73.9 PVD (PERIPHERAL VASCULAR DISEASE): ICD-10-CM

## 2025-06-18 PROCEDURE — 99214 OFFICE O/P EST MOD 30 MIN: CPT | Performed by: INTERNAL MEDICINE

## 2025-06-18 PROCEDURE — 3008F BODY MASS INDEX DOCD: CPT | Performed by: INTERNAL MEDICINE

## 2025-06-18 PROCEDURE — 1036F TOBACCO NON-USER: CPT | Performed by: INTERNAL MEDICINE

## 2025-06-18 PROCEDURE — 3074F SYST BP LT 130 MM HG: CPT | Performed by: INTERNAL MEDICINE

## 2025-06-18 PROCEDURE — 3078F DIAST BP <80 MM HG: CPT | Performed by: INTERNAL MEDICINE

## 2025-06-18 RX ORDER — NITROGLYCERIN 0.4 MG/1
0.4 TABLET SUBLINGUAL EVERY 5 MIN PRN
Qty: 25 TABLET | Refills: 5 | Status: SHIPPED | OUTPATIENT
Start: 2025-06-18

## 2025-06-18 RX ORDER — ATORVASTATIN CALCIUM 10 MG/1
10 TABLET, FILM COATED ORAL NIGHTLY
Qty: 90 TABLET | Refills: 3 | Status: SHIPPED | OUTPATIENT
Start: 2025-06-18

## 2025-06-18 RX ORDER — LISINOPRIL 20 MG/1
20 TABLET ORAL DAILY
Qty: 90 TABLET | Refills: 3 | Status: SHIPPED | OUTPATIENT
Start: 2025-06-18

## 2025-06-18 NOTE — PATIENT INSTRUCTIONS
Follow up 1 year    DID YOU KNOW  We have a pharmacy here in the [facility].  They can fill all prescriptions, not just cardiac medications.  Prescriptions from other pharmacies can easily be transferred to the  pharmacy by the  pharmacist on site.   pharmacies offer FREE HOME DELIVERY on medications to anywhere in [place]. They can sync your medications. Typically prescriptions can be ready in 10 - 15 minutes. If pharmacy is unable to fill your  prescription or if cost is more than your paying now the Pharmacist can easily transfer back to your Pharmacy of choice. Pharmacy phone # [phone].     Please bring all medicines, vitamins, and herbal supplements with you in original bottles to every appointment!!!!    Prescriptions will not be filled unless you are compliant with your follow up appointments or have a follow up appointment scheduled as per instruction of your physician. Refills should be requested at the time of your visit.

## 2025-06-18 NOTE — PROGRESS NOTES
Patient:  Juan Manuel Yeager  YOB: 1965  MRN: [id number]       Impression/Plan:     Diagnoses and all orders for this visit:  Atherosclerosis of native coronary artery of native heart without angina pectoris  -    Very high functional capacity without any signs of CHF or angina or dysrhythmia.  -    Stress test shows him able to accomplish well over average for a 60-year-old male at 10M ET without ischemia.  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 place 1 tablet under tongue every 5 minutes for up to 3 doses as needed for chest pain. Call 911 if pain persists  Primary hypertension  -     Uncontrolled without adverse effect of medications and stable renal function  -     lisinopril 20 mg tablet; Take 1 tablet (20 mg) by mouth once daily.  Claudication  PVD (peripheral vascular disease)  -    His claudication is slightly worse interestingly more so on the left leg than the right the right having had total occlusion of the SFA.  The left does have a severe lesion in the iliac that may need addressed and he has follow-up with vascular surgery within the next 2 months to discuss further.  He has no discoloration of the leg or resting discomfort and unless he walks more than for 5 minutes rapidly he has no symptoms.  Mixed hyperlipidemia  -    Excellent control on Repatha Lipitor combination without side effect continue same  -     atorvastatin (Lipitor) 10 mg tablet; Take 1 tablet (10 mg) by mouth once daily at bedtime.      Chief Complaint/Active Symptoms:      Chief Complaint   Patient presents with    Follow-up     Presents today for yearly follow up and DOT letter of clearance         Juan Manuel Yeager is a 60 y.o. male who presents with  coronary artery disease having undergone CABG August 2019 4 vessels including LIMA. Also  with hypertension, hyperlipidemia and peripheral vascular disease.  He is statin intolerant hence on Repatha and low-dose Lipitor .  7/17/2023 CT angiography severe proximal left iliac stenosis and severe right common femoral artery stenosis with occlusion the right SFA for a length of 11 cm. No aneurysm was identified.  mild carotid artery disease duplex 2000 1950 to 69% on the left .  He follows with Dr. 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  Very physical job having to carry large diameter hose for 50 yards pulling at back without any associated angina or shortness of breath.  He does have claudication but it was minimal at that time.  Blood pressure was well-controlled as was lipid at that time.    Laboratory 6/26/2024 CBC unremarkable, CMP normal except borderline decreased total protein cholesterol 74 HDL 37 LDL 20 July 2024 carotid duplex based on current criteria at less than 50% bilaterally.  Previously criteria described as 50 to 69%.  Importantly no progression    July 2024 PVR moderate bilateral disease ANALIA 0.66-0.68    5/21/2025 regular exercise stress test  1. Normal functional capacity at 10.1 MET. Without chest tightness. Did describe mild dyspnea and fatigue.   2. Normal chronotropic and blood pressure response to exercise.   3. No exertional dysrhythmia.   4. 1 isolated PVC during the entire study this is within normal range.   5. Normal ECG response to exercise without evidence of ischemia.   6. Normal exercise stress test.   7. Adequate level of stress achieved.    He denies angina, dyspnea, palpitation, edema, lightheadedness or syncope.  He has had no symptoms of  neurologic deterioration.  There have been no hospitalizations or emergency room visits since last office visit.    He remains extremely active at work.  He still drags very heavy hoses  around.  He works as a .  He has had no exertional chest pain or shortness of breath.  He does say if he walks at a brisk pace for more than 4 to 5 minutes he will have pain in his legs particularly the left leg.  As noted above he was able to accomplish 10 METS of exercise without inducible ischemia but he said his legs were hurting quite a bit while he did that.                     Review of Systems: Unremarkable except as noted above    Meds     Current Outpatient Medications   Medication Instructions    aspirin 81 mg EC tablet 1 tablet, Daily    atorvastatin (LIPITOR) 10 mg, oral, Nightly    buPROPion XL (Wellbutrin XL) 300 mg 24 hr tablet 1 tablet, Daily    lisinopril 20 mg, oral, Daily    nitroglycerin (NITROSTAT) 0.4 mg, sublingual, Every 5 min PRN, place 1 tablet under tongue every 5 minutes for up to 3 doses as needed for chest pain. Call 911 if pain persists    Repatha SureClick 140 mg, subcutaneous, Every 14 days        Allergies   Allergies[1]      Annotated Problems     Specialty Problems          Cardiology Problems    Atherosclerosis of coronary artery     · 9/21/2022 stress test: 10.1 MET without evidence of ischemia at 94% target heart rate      2019, 4 vessel, L-LAD, Free JOHN T graft off LIMA to OM2, SVG- D1, SVG - PLV              7/15/2019 angiography. LVEF 55%. LM-normal. LAD-90%. CX-80%. RCA-occluded         HLD (hyperlipidemia)    Primary hypertension    PVD (peripheral vascular disease)    7/17/2023 CT angiography severe proximal left iliac stenosis and severe right common femoral artery stenosis with occlusion the right SFA for a length of 11 cm. No aneurysm was identified.         S/P CABG (coronary artery bypass graft)        2019, 4 vessel, L-LAD, Free JOHN T graft off LIMA to OM2, SVG- D1, SVG - PLV         Bilateral carotid artery disease      2019 less than 50% right internal carotid stenosis 50 to 69% on the left via duplex             Problem List     Patient  "Active Problem List    Diagnosis Date Noted    Bilateral carotid artery disease 06/19/2024    Atherosclerosis of coronary artery 12/26/2023    Frequent headaches 12/26/2023    HLD (hyperlipidemia) 12/26/2023    Primary hypertension 12/26/2023    PVD (peripheral vascular disease) 12/26/2023    S/P CABG (coronary artery bypass graft) 12/26/2023       Objective:     Vitals:    06/18/25 0750   BP: 114/70   BP Location: Left arm   Patient Position: Sitting   Pulse: 83   Weight: 82.1 kg (181 lb)   Height: 1.727 m (5' 8\")      Wt Readings from Last 4 Encounters:   06/18/25 82.1 kg (181 lb)   06/19/24 83.2 kg (183 lb 6.4 oz)   07/27/23 86.6 kg (191 lb)   03/29/23 87.5 kg (193 lb)           LAB:     Lab Results   Component Value Date    WBC 7.0 06/26/2024    HGB 14.9 06/26/2024    HCT 44.2 06/26/2024     06/26/2024    CHOL 74 06/26/2024    TRIG 84 06/26/2024    HDL 37.2 06/26/2024    ALT 30 06/26/2024    AST 25 06/26/2024     06/26/2024    K 4.2 06/26/2024     06/26/2024    CREATININE 0.89 06/26/2024    BUN 23 06/26/2024    CO2 28 06/26/2024    TSH 1.30 03/09/2023    INR 1.1 07/15/2019    HGBA1C 5.4 07/15/2019         Physical Exam     General Appearance: alert and oriented to person, place and time, in no acute distress  Cardiovascular: normal rate, regular rhythm, normal S1 and S2, no murmurs, rubs, clicks, or gallops,  no JVD  Pulmonary/Chest: clear to auscultation bilaterally- no wheezes, rales or rhonchi, normal air movement, no respiratory distress  Abdomen: soft, non-tender, non-distended, normal bowel sounds, no masses   Extremities: no cyanosis, clubbing or edema  Skin: warm and dry, no rash or erythema  Eyes: EOMI  Neck: supple and non-tender without mass, no thyromegaly   Neurological: alert, oriented, normal speech, no focal findings or movement disorder noted  Vascular: Pulses not well palpable on the right 1+ on the left feet are warm.  Bilateral carotid bruits      Scribe Attestation  By " signing my name below, I, Briseida Torrez LPN , Scribe   attest that this documentation has been prepared under the direction and in the presence of Dr. Dylan Leonard MD,[facility].      Provider attestation-scribe documentation  Any medical record entries made by the scribe were at my discretion and personally dictated by me.  I have reviewed the chart and agree that the record accurately reflects my personal performance of the history, physical exam, discussion and plan.                 [1]   Allergies  Allergen Reactions    Penicillins Other    Pravastatin Myalgia    Rosuvastatin Myalgia    Simvastatin Other

## 2025-07-15 ENCOUNTER — TELEPHONE (OUTPATIENT)
Dept: CARDIOLOGY | Facility: CLINIC | Age: 60
End: 2025-07-15
Payer: COMMERCIAL

## 2025-07-15 NOTE — TELEPHONE ENCOUNTER
RN received phone call from patient stating that Dr. Dylan Leonard MD, FACC ordered patient to have ANALIA (Vascular Us Ankle Brachial Index) done.  Patient was scheduled for this test on 07/28/25, however, he canceled the test due to out of pocket cost.      Patient is inquiring that if he does NOT complete this test, will that impact Dr. Dylan Leonard MD, FACC ODOT clearance for patient in the future.  Dr. Dylan Leonard MD, FACC sent letter for this year, but patient verbalizes concern that Dr. Dylan Leonard MD, FACC may not clear him next year if this test is not completed.    RN routed message to Dr. Dylan Leonard MD, FACC for clarification.    Flores Marcano RN

## 2025-07-16 NOTE — TELEPHONE ENCOUNTER
RN called patient to advise of Dr. Dylan Leonard MD, FACC message.  Patient verbalizes good understanding with no further questions.    Flores Marcano RN

## 2025-07-28 ENCOUNTER — APPOINTMENT (OUTPATIENT)
Dept: RADIOLOGY | Facility: HOSPITAL | Age: 60
End: 2025-07-28
Payer: COMMERCIAL

## 2025-07-30 ENCOUNTER — APPOINTMENT (OUTPATIENT)
Dept: RADIOLOGY | Facility: HOSPITAL | Age: 60
End: 2025-07-30
Payer: COMMERCIAL

## 2025-08-01 ENCOUNTER — APPOINTMENT (OUTPATIENT)
Dept: VASCULAR SURGERY | Facility: CLINIC | Age: 60
End: 2025-08-01
Payer: COMMERCIAL

## 2026-06-17 ENCOUNTER — APPOINTMENT (OUTPATIENT)
Dept: CARDIOLOGY | Facility: CLINIC | Age: 61
End: 2026-06-17
Payer: COMMERCIAL